# Patient Record
Sex: FEMALE | Race: WHITE | Employment: FULL TIME | URBAN - METROPOLITAN AREA
[De-identification: names, ages, dates, MRNs, and addresses within clinical notes are randomized per-mention and may not be internally consistent; named-entity substitution may affect disease eponyms.]

---

## 2017-09-26 ENCOUNTER — HOSPITAL ENCOUNTER (OUTPATIENT)
Dept: PHYSICAL THERAPY | Age: 57
Discharge: HOME | End: 2017-09-26
Payer: COMMERCIAL

## 2017-09-26 PROCEDURE — 97140 MANUAL THERAPY 1/> REGIONS: CPT

## 2017-09-26 PROCEDURE — 97161 PT EVAL LOW COMPLEX 20 MIN: CPT

## 2017-09-26 NOTE — PROGRESS NOTES
Amadou Rader 30  [facility]  [facility]  [facility], [address] ,[address], [address]  Phone: [phone]  Fax: [phone] / [address]  Patient Name: Harpal Kat : 1960   Medical   Diagnosis: Low back pain [M54.5] Treatment Diagnosis: LBP   Onset Date: chronic     Referral Source: Emilie Doyle MD Start of [facility]): 2017   Prior Hospitalization: See medical history Provider #: [id number]   Prior Level of Function: Manageable sx with ADLs   Comorbidities: H/o osteopenia, h/o L>R ankle pain given peroneal tear, h/o L RCT   Medications: Verified on Patient Summary List   The Plan of Care and following information is based on the information from the initial evaluation.   ==================================================================================  Assessment / key information:  Patient is a 61 y.o. female who presents to [facility] with Dx of LBP. Patient reports initial onset of sx in  which were of unknown etiology with worsening of sx in the last 2-3 years. She reports initial c/o LBP & progressive worsening of sx into L>R LE Patient reports sx in lower back & L glut pain are her primary complaint & are fairly constant in nature with worsening of sx with bending, lifting & activities in general over a long period of time. Sx improve with laying on her back with LE supported & use of ibuprofen. Average reported pain level at 2-3/10, 5/10 at worst & 0-1/10 at best. Upon objective evaluation patient demonstrates FIS was [facility] with PDM, EIS limited at 75% with PDM, RFIS had NE on sx, RFIL inc sx, NWAR, REIL & REIL with PT OP dec sx in lower back & RBAR. Prone knee flexion test was (+) on L>R LE for reproduction of LBP, NE on distal LE sx. Decreased sensitivity with B prone knee flexion test after REIL progressions today.  Patient can benefit from PT interventions to improve posture, decrease pain & improve strength to facilitate ADLs & overall functional status.   ==================================================================================  Eval Complexity: History HIGH Complexity :3+ comorbidities / personal factors will impact the outcome/ POC ;  Examination  MEDIUM Complexity : 3 Standardized tests and measures addressing body structure, function, activity limitation and / or participation in recreation ; Presentation LOW Complexity : Stable, uncomplicated ;  Decision Making MEDIUM Complexity : FOTO score of 26-74; Overall Complexity LOW   Problem List: pain affecting function, decrease ROM, decrease strength, impaired gait/ balance, decrease ADL/ functional abilitiies, decrease activity tolerance, decrease flexibility/ joint mobility, decrease transfer abilities and other FOTO 59 points   Treatment Plan may include any combination of the following: Therapeutic exercise, Therapeutic activities, Neuromuscular re-education, Physical agent/modality, Gait/balance training, Manual therapy, Aquatic therapy, Patient education, Self Care training, Functional mobility training, Home safety training, Stair training and Other: DN prn  Patient / Family readiness to learn indicated by: asking questions, trying to perform skills and interest  Persons(s) to be included in education: patient (P)  Barriers to Learning/Limitations: None  Measures taken:    Patient Goal (s): \"reduce pain in hips, leg & back\"   Patient self reported health status: good  Rehabilitation Potential: good   Short Term Goals: To be accomplished in  2  weeks:  1) Establish HEP to prevent further disability. 2) Patient will report decreased c/o pain to < or = 3-4/10 at worst to facilitate return to light lifting with manageable sx in lower back.   3) Patient to report 50% improvement in overall function in preparation for return to recreational activities with manageable sx in lower back.   Long Term Goals: To be accomplished in  4  weeks:  1) Improve FOTO score from 59 points to > or = 70 points indicating improved tolerance with ADLs in regards to lower back. 2) Patient will demonstrate (-) neural tension with B prone knee flexion on B LE to facilitate driving activities with manageable sx. 3) Patient to be independent & compliant with HEP in preparation for D/C.  4) Patient will be able to demonstrate the appropriate body mechanics with lifting weighted box to prevent further injury for return to lifting at home/work. Frequency / Duration:   Patient to be seen  2-3  times per week for 4  weeks:  Patient / Caregiver education and instruction: self care, activity modification, brace/ splint application and exercises  G-Codes (GP): NA  Therapist Signature: KAYLEE Wynn cert MDT Date: 3/53/4345   Certification Period: None Time: 3:18 PM   ===========================================================================================  I certify that the above Physical Therapy Services are being furnished while the patient is under my care. I agree with the treatment plan and certify that this therapy is necessary. Physician Signature:        Date:       Time:     Please sign and return to [facility] [place] or you may fax the signed copy to [phone]. Thank you.

## 2017-09-26 NOTE — PROGRESS NOTES
PHYSICAL THERAPY - DAILY TREATMENT NOTE    Patient Name: Adam Juárez        Date: 2017  : 1960   YES Patient  Verified  Visit #:     Insurance: Payor: Fercho White / Plan: [address] [place] [address] / Product Type: PPO /      In time: 3:20 P Out time: 4:10 P   Total Treatment Time: 50     Medicare Time Tracking (below)   Total Timed Codes (min):  NA 1:1 Treatment Time:  NA     TREATMENT AREA =  Low back pain [M54.5]    SUBJECTIVE  Pain Level (on 0 to 10 scale):  3-4  / 10   Medication Changes/New allergies or changes in medical history, any new surgeries or procedures? NO    If yes, update Summary List   Subjective Functional Status/Changes:  []  No changes reported     See POC           OBJECTIVE  Modalities Rationale:    PD   min [] Estim, type/location:                                      []  att     []  unatt     []  w/US     []  w/ice    []  w/heat    min []  Mechanical Traction: type/lbs                   []  pro   []  sup   []  int   []  cont    []  before manual    []  after manual    min []  Ultrasound, settings/location:      min []  Iontophoresis w/ dexamethasone, location:                                               []  take home patch       []  in clinic    min []  Ice     []  Heat    location/position:     min []  Vasopneumatic Device, press/temp:     min []  Other:    [] Skin assessment post-treatment (if applicable):    []  intact    []  redness- no adverse reaction     []redness  adverse reaction:         min Therapeutic Exercise:  [x]  See flow sheet   Rationale:         10 min Manual Therapy: PA mobs to l/s OP to REIL in prone   Rationale:      decrease pain and increase ROM to improve patient's ability to return to pain-free sitting     min Therapeutic Activity:    Rationale:      min Neuromuscular Re-ed:    Rationale:        min Gait Training:    Rationale:       min Patient Education:  YES  Reviewed HEP   []  Progressed/Changed HEP based on:         Other Objective/Functional Measures:    See POC  Reviewed posture & initial HEP with pt     Post Treatment Pain Level (on 0 to 10) scale:   2  / 10     ASSESSMENT  Assessment/Changes in Function:   See POC      []  See Progress Note/Recertification   Patient will continue to benefit from skilled PT services to modify and progress therapeutic interventions, address functional mobility deficits, address ROM deficits, address strength deficits and instruct in home and community integration to attain remaining goals.    Progress toward goals / Updated goals:    Progressing towards goals established at [place] [facility] [address]  [x]  Upgrade activities as tolerated YES Continue plan of care   []  Discharge due to :    []  Other:      Therapist: Sanjeev Chung PT    Date: 9/26/2017 Time: 7:18 PM     Future Appointments  Date Time Provider Violetta Wolfe   10/2/2017 6:30 AM Sanjeev Chung, PT [place] [address]   10/6/2017 7:30 AM Sanjeev Chung PT [place] [address]   10/9/2017 6:30 AM Sanjeev Chung PT [place] [address]   10/13/2017 6:30 AM Sanjeev Chung, PT [facility] [address]   10/16/2017 6:30 AM Sanjeev Chung PT [facility] [address]   10/20/2017 7:30 AM Sanjeev Chung, PT [facility]   10/23/2017 6:30 AM Sanjeev Chung, PT [facility] [address]   10/27/2017 7:30 AM Sanjeev Chung PT [facility]   10/30/2017 6:30 AM Sanjeev Chung PT [facility] [address]   11/3/2017 7:30 AM Nancy Esparza, PT [facility]

## 2017-10-02 ENCOUNTER — HOSPITAL ENCOUNTER (OUTPATIENT)
Dept: PHYSICAL THERAPY | Age: 57
Discharge: HOME | End: 2017-10-02
Payer: COMMERCIAL

## 2017-10-02 PROCEDURE — 97110 THERAPEUTIC EXERCISES: CPT

## 2017-10-06 ENCOUNTER — HOSPITAL ENCOUNTER (OUTPATIENT)
Dept: PHYSICAL THERAPY | Age: 57
Discharge: HOME | End: 2017-10-06
Payer: COMMERCIAL

## 2017-10-06 PROCEDURE — 97140 MANUAL THERAPY 1/> REGIONS: CPT

## 2017-10-06 PROCEDURE — 97110 THERAPEUTIC EXERCISES: CPT

## 2017-10-06 NOTE — PROGRESS NOTES
PHYSICAL THERAPY - DAILY TREATMENT NOTE    Patient Name: Silvio Chinchilla        Date: 10/6/2017  : 1960   YES Patient  Verified  Visit #:   3   of   12  Insurance: Payor: Sly Moon / Plan: [address] [place] [address] / Product Type: PPO /      In time: 7 A Out time: 8:10 A   Total Treatment Time: 65     Medicare Time Tracking (below)   Total Timed Codes (min):  NA 1:1 Treatment Time:  NA     TREATMENT AREA =  Low back pain [M54.5]    SUBJECTIVE  Pain Level (on 0 to 10 scale):  3  / 10   Medication Changes/New allergies or changes in medical history, any new surgeries or procedures? NO    If yes, update Summary List   Subjective Functional Status/Changes:  []  No changes reported     Patient reports no new complaints since last treatment.            OBJECTIVE  Modalities Rationale:     decrease pain to improve patient's ability to return to pain-free lifting   min [] Estim, type/location:                                      []  att     []  unatt     []  w/US     []  w/ice    []  w/heat    min []  Mechanical Traction: type/lbs                   []  pro   []  sup   []  int   []  cont    []  before manual    []  after manual    min []  Ultrasound, settings/location:      min []  Iontophoresis w/ dexamethasone, location:                                               []  take home patch       []  in clinic   10 min []  Ice     [x]  Heat    location/position: Supine to l/s     min []  Vasopneumatic Device, press/temp:     min []  Other:    [] Skin assessment post-treatment (if applicable):    []  intact    []  redness- no adverse reaction     []redness  adverse reaction:        45/  35 min Therapeutic Exercise:  [x]  See flow sheet   Rationale:      increase ROM and increase strength to improve the patients ability to return to  Pain-free housework     10 min Manual Therapy: PA mobs to l/s f/b OP to REIL in prone   Rationale:      decrease pain and increase ROM to improve patient's ability to return to pain-free standing      min Therapeutic Activity:    Rationale:      min Neuromuscular Re-ed:    Rationale:        min Gait Training:    Rationale:       min Patient Education:  YES  Reviewed HEP   []  Progressed/Changed HEP based on: Other Objective/Functional Measures:    TE per flow sheet     Post Treatment Pain Level (on 0 to 10) scale:     2/ 10     ASSESSMENT  Assessment/Changes in Function:   Pt reported immediate reduction of sx with manual therapy & PA pressure to l/s     []  See Progress Note/Recertification   Patient will continue to benefit from skilled PT services to modify and progress therapeutic interventions, address functional mobility deficits, address ROM deficits, address strength deficits, analyze and address soft tissue restrictions and instruct in home and community integration to attain remaining goals.    Progress toward goals / Updated goals:    Progressing towards STG 2, 3     PLAN  [x]  Upgrade activities as tolerated YES Continue plan of care   []  Discharge due to :    []  Other:      Therapist: Desmond Washington PT    Date: 10/6/2017 Time: 10:35 AM     Future Appointments  Date Time Provider Violetta Wolfe   10/9/2017 6:30 AM Desmond Washington PT [facility]   10/13/2017 6:30 AM Desmond Washington PT [facility]   10/16/2017 6:30 AM Desmond Washington PT [facility]   10/20/2017 7:30 AM Desmond Washington, PT [facility]   10/23/2017 6:30 AM Desmond Washington PT [facility]   10/27/2017 7:30 AM Desmond Washington PT [facility]   10/30/2017 6:30 AM Desmond Washington PT [facility]   11/3/2017 7:30 AM Nancy Bean, PT [facility]

## 2017-10-09 ENCOUNTER — HOSPITAL ENCOUNTER (OUTPATIENT)
Dept: PHYSICAL THERAPY | Age: 57
Discharge: HOME | End: 2017-10-09
Payer: COMMERCIAL

## 2017-10-09 PROCEDURE — 97140 MANUAL THERAPY 1/> REGIONS: CPT

## 2017-10-09 PROCEDURE — 97110 THERAPEUTIC EXERCISES: CPT

## 2017-10-09 NOTE — PROGRESS NOTES
PHYSICAL THERAPY - DAILY TREATMENT NOTE    Patient Name: Laurie Collins        Date: 10/9/2017  : 1960   YES Patient  Verified  Visit #:     Insurance: Payor: Edwar Cunha / Plan: [address] [place] [address] / Product Type: PPO /      In time: 6:30 A Out time: 7:25 A    Total Treatment Time: 54     Medicare Time Tracking (below)   Total Timed Codes (min):  NA 1:1 Treatment Time:  NA     TREATMENT AREA =  Low back pain [M54.5]    SUBJECTIVE  Pain Level (on 0 to 10 scale):  0  / 10   Medication Changes/New allergies or changes in medical history, any new surgeries or procedures? NO    If yes, update Summary List   Subjective Functional Status/Changes:  []  No changes reported   Patient reports she did well after last PT, she does not   have pain today.        OBJECTIVE  Modalities Rationale:     decrease pain to improve patient's ability to return to pain-free standing   min [] Estim, type/location:                                      []  att     []  unatt     []  w/US     []  w/ice    []  w/heat    min []  Mechanical Traction: type/lbs                   []  pro   []  sup   []  int   []  cont    []  before manual    []  after manual    min []  Ultrasound, settings/location:      min []  Iontophoresis w/ dexamethasone, location:                                               []  take home patch       []  in clinic   10 min []  Ice     [x]  Heat    location/position: MHP to l/s in supine    min []  Vasopneumatic Device, press/temp:     min []  Other:    [] Skin assessment post-treatment (if applicable):    []  intact    []  redness- no adverse reaction     []redness  adverse reaction:        35 min Therapeutic Exercise:  [x]  See flow sheet   Rationale:      increase ROM and increase strength to improve the patients ability to return to pain-free fitness activities     10 min Manual Therapy: PA mobs to l/s in prone   Rationale:      increase ROM to improve patient's ability to return to upright standing     min Therapeutic Activity:    Rationale:      min Neuromuscular Re-ed:    Rationale:        min Gait Training:    Rationale:       min Patient Education:  YES  Reviewed HEP   []  Progressed/Changed HEP based on: Other Objective/Functional Measures:    TE per flow sheet     Post Treatment Pain Level (on 0 to 10) scale:   0  / 10     ASSESSMENT  Assessment/Changes in Function:   Good tolerance to today's treatment, no increase in pain      []  See Progress Note/Recertification   Patient will continue to benefit from skilled PT services to modify and progress therapeutic interventions, address functional mobility deficits, address ROM deficits, address strength deficits, assess and modify postural abnormalities and instruct in home and community integration to attain remaining goals.    Progress toward goals / Updated goals:    Progressing towards STG 2, 3     PLAN  [x]  Upgrade activities as tolerated YES Continue plan of care   []  Discharge due to :    []  Other:      Therapist: Bibi Donohue PT    Date: 10/9/2017 Time: 8:52 AM     Future Appointments  Date Time Provider Violetta Wolfe   10/13/2017 6:30 AM Bibi Donohue PT [address]   10/16/2017 6:30 AM Bibi Donohue PT [address]   10/20/2017 7:30 AM Bibi Donohue PT [place] [address] [facility]   10/23/2017 6:30 AM Bibi Donohue PT [address]   10/27/2017 7:30 AM Bibi Donohue PT [address]   10/30/2017 6:30 AM Bibi Donohue PT [place] [address] [facility]   11/3/2017 7:30 AM Nancy Marin, PT [address]

## 2017-10-13 ENCOUNTER — HOSPITAL ENCOUNTER (OUTPATIENT)
Dept: PHYSICAL THERAPY | Age: 57
Discharge: HOME | End: 2017-10-13
Payer: COMMERCIAL

## 2017-10-13 PROCEDURE — 97140 MANUAL THERAPY 1/> REGIONS: CPT

## 2017-10-13 PROCEDURE — 97110 THERAPEUTIC EXERCISES: CPT

## 2017-10-13 NOTE — PROGRESS NOTES
PHYSICAL THERAPY - DAILY TREATMENT NOTE    Patient Name: Tamara Weiss        Date: 10/13/2017  : 1960   YES Patient  Verified  Visit #:     Insurance: Payor: Tyrell Ponce / Plan: [address] [place] [address] / Product Type: PPO /      In time: 6:35 A Out time: 7:40 A    Total Treatment Time: 55     Medicare Time Tracking (below)   Total Timed Codes (min):  NA 1:1 Treatment Time:  NA     TREATMENT AREA =  Low back pain [M54.5]    SUBJECTIVE  Pain Level (on 0 to 10 scale):  0  / 10   Medication Changes/New allergies or changes in medical history, any new surgeries or procedures? NO    If yes, update Summary List   Subjective Functional Status/Changes:  []  No changes reported   Patient reports no new complaints since last treatment.          OBJECTIVE  Modalities Rationale:     decrease pain to improve patient's ability to return to pain-free standing   min [] Estim, type/location:                                      []  att     []  unatt     []  w/US     []  w/ice    []  w/heat    min []  Mechanical Traction: type/lbs                   []  pro   []  sup   []  int   []  cont    []  before manual    []  after manual    min []  Ultrasound, settings/location:      min []  Iontophoresis w/ dexamethasone, location:                                               []  take home patch       []  in clinic   10 min []  Ice     [x]  Heat    location/position: MHP to l/s in supine    min []  Vasopneumatic Device, press/temp:     min []  Other:    [] Skin assessment post-treatment (if applicable):    []  intact    []  redness- no adverse reaction     []redness  adverse reaction:        35 min Therapeutic Exercise:  [x]  See flow sheet   Rationale:      increase ROM and increase strength to improve the patients ability to return to pain-free fitness activities     10 min Manual Therapy: PA mobs to l/s in prone, OP to REIL at prone   Rationale:      increase ROM to improve patient's ability to return to upright standing     min Therapeutic Activity:    Rationale:      min Neuromuscular Re-ed:    Rationale:        min Gait Training:    Rationale:       min Patient Education:  YES  Reviewed HEP   []  Progressed/Changed HEP based on: Other Objective/Functional Measures:    TE per flow sheet     Post Treatment Pain Level (on 0 to 10) scale:   0  / 10     ASSESSMENT  Assessment/Changes in Function:   Good tolerance to today's treatment, no increase in pain, prone knee flex test continues to be mildly (+) for dural tension      []  See Progress Note/Recertification   Patient will continue to benefit from skilled PT services to modify and progress therapeutic interventions, address functional mobility deficits, address ROM deficits, address strength deficits, assess and modify postural abnormalities and instruct in home and community integration to attain remaining goals.    Progress toward goals / Updated goals:    Progressing towards STG 2, 3     PLAN  [x]  Upgrade activities as tolerated YES Continue plan of care   []  Discharge due to :    []  Other:      Therapist: Black Hoang, PT    Date: 10/13/2017 Time: 8:52 AM     Future Appointments  Date Time Provider Violetta Wolfe   10/16/2017 6:30 AM Black Hoang, PT [facility]   10/20/2017 7:30 AM Black Hoang, PT [facility]   10/23/2017 6:30 AM King Miller [facility]   10/27/2017 7:30 AM Black Hoang, PT [facility]   10/30/2017 6:30 AM Black Hoang PT [facility]   11/3/2017 7:30 AM New Carvajal PT [facility]

## 2017-10-16 ENCOUNTER — HOSPITAL ENCOUNTER (OUTPATIENT)
Dept: PHYSICAL THERAPY | Age: 57
Discharge: HOME | End: 2017-10-16
Payer: COMMERCIAL

## 2017-10-16 PROCEDURE — 97140 MANUAL THERAPY 1/> REGIONS: CPT

## 2017-10-16 PROCEDURE — 97110 THERAPEUTIC EXERCISES: CPT

## 2017-10-16 NOTE — PROGRESS NOTES
PHYSICAL THERAPY - DAILY TREATMENT NOTE    Patient Name: Joo Mills        Date: 10/16/2017  : 1960   YES Patient  Verified  Visit #:      of   12  Insurance: Payor: Jhonny David / Plan: [address] [place] [address] / Product Type: PPO /      In time: 7 A Out time: 8:00 A   Total Treatment Time: 55/  40     Medicare Time Tracking (below)   Total Timed Codes (min):  NA 1:1 Treatment Time:  NA     TREATMENT AREA =  Low back pain [M54.5]    SUBJECTIVE  Pain Level (on 0 to 10 scale):  0  / 10   Medication Changes/New allergies or changes in medical history, any new surgeries or procedures? NO    If yes, update Summary List   Subjective Functional Status/Changes:  []  No changes reported     Patient reports that she is doing well today, no pain in her back or down her legs today.            OBJECTIVE  Modalities Rationale:     decrease pain to improve patient's ability to return to pain-free standing    min [] Estim, type/location:                                      []  att     []  unatt     []  w/US     []  w/ice    []  w/heat    min []  Mechanical Traction: type/lbs                   []  pro   []  sup   []  int   []  cont    []  before manual    []  after manual    min []  Ultrasound, settings/location:      min []  Iontophoresis w/ dexamethasone, location:                                               []  take home patch       []  in clinic   10 min []  Ice     [x]  Heat    location/position: Supine to l/s     min []  Vasopneumatic Device, press/temp:     min []  Other:    [] Skin assessment post-treatment (if applicable):    []  intact    []  redness- no adverse reaction     []redness  adverse reaction:        35/  30 min Therapeutic Exercise:  [x]  See flow sheet   Rationale:      increase ROM and increase strength to improve the patients ability to return to light lifting for housework      10 min Manual Therapy: PA mobs to l/s in prone f/b OP to REIL   Rationale:   Improve ROM to facilitate upright standing        min Therapeutic Activity:    Rationale:         min Neuromuscular Re-ed:    Rationale:       min Gait Training:    Rationale:       min Patient Education:  YES  Reviewed HEP   []  Progressed/Changed HEP based on: Other Objective/Functional Measures:    TE per flow sheet     Post Treatment Pain Level (on 0 to 10) scale:   0  / 10     ASSESSMENT  Assessment/Changes in Function:     Good tolerance to today's treatment, no increase in pain      []  See Progress Note/Recertification   Patient will continue to benefit from skilled PT services to modify and progress therapeutic interventions, address functional mobility deficits, address ROM deficits, address strength deficits, analyze and address soft tissue restrictions, analyze and modify body mechanics/ergonomics, assess and modify postural abnormalities and instruct in home and community integration to attain remaining goals.    Progress toward goals / Updated goals:    Progressing towards STG 2, 3     PLAN  [x]  Upgrade activities as tolerated YES Continue plan of care   []  Discharge due to :    []  Other:      Therapist: Wong Hernandez PT    Date: 10/16/2017 Time: 9:41 AM     Future Appointments  Date Time Provider Violetta Wolfe   10/20/2017 7:30 AM Wong Hernandez PT [facility]   10/23/2017 [facility]   10/27/2017 7:30 AM Wong Hernandez PT [facility]   10/30/2017 6:30 AM Wong Hernandez PT [facility]   11/3/2017 7:30 AM Wong Hernandez PT [facility]

## 2017-10-20 ENCOUNTER — HOSPITAL ENCOUNTER (OUTPATIENT)
Dept: PHYSICAL THERAPY | Age: 57
Discharge: HOME | End: 2017-10-20
Payer: COMMERCIAL

## 2017-10-20 PROCEDURE — 97110 THERAPEUTIC EXERCISES: CPT

## 2017-10-20 PROCEDURE — 97140 MANUAL THERAPY 1/> REGIONS: CPT

## 2017-10-20 NOTE — PROGRESS NOTES
PHYSICAL THERAPY - DAILY TREATMENT NOTE    Patient Name: Oliva Rick        Date: 10/20/2017  : 1960   YES Patient  Verified  Visit #:     Insurance: Payor: Jarred Porras / Plan: [address] [place] [address] / Product Type: PPO /      In time: 7:30 A Out time: 8:25 A   Total Treatment Time: 50     Medicare Time Tracking (below)   Total Timed Codes (min):  NA 1:1 Treatment Time:  NA     TREATMENT AREA =  Low back pain [M54.5]    SUBJECTIVE  Pain Level (on 0 to 10 scale):  0  / 10   Medication Changes/New allergies or changes in medical history, any new surgeries or procedures? NO    If yes, update Summary List   Subjective Functional Status/Changes:  []  No changes reported     Patient reports she has been feeling better, not much leg pain some mild soreness in her lower back but the exercises are making her feel better & stronger.           OBJECTIVE  Modalities Rationale:     decrease pain to improve patient's ability to return to pain-free standing   min [] Estim, type/location:                                      []  att     []  unatt     []  w/US     []  w/ice    []  w/heat    min []  Mechanical Traction: type/lbs                   []  pro   []  sup   []  int   []  cont    []  before manual    []  after manual    min []  Ultrasound, settings/location:      min []  Iontophoresis w/ dexamethasone, location:                                               []  take home patch       []  in clinic   10 min [x]  Ice     []  Heat    location/position: Supine to     min []  Vasopneumatic Device, press/temp:     min []  Other:    [] Skin assessment post-treatment (if applicable):    []  intact    []  redness- no adverse reaction     []redness  adverse reaction:        30 min Therapeutic Exercise:  [x]  See flow sheet   Rationale:      increase ROM and increase strength to improve the patients ability to tolerate light lifting    10 min Manual Therapy: PA mobs to l/s, OP to REIL in prone    Rationale: decrease pain and increase ROM to improve patient's ability to tolerate upright standing     min Therapeutic Activity:    Rationale:      min Neuromuscular Re-ed:    Rationale:        min Gait Training:    Rationale:       min Patient Education:  YES  Reviewed HEP   []  Progressed/Changed HEP based on: Other Objective/Functional Measures: Added BET with dowel, hip hinge (seated & sit to stand)     Post Treatment Pain Level (on 0 to 10) scale:   0  / 10     ASSESSMENT  Assessment/Changes in Function:   Good understanding of BET concepts, improved strength with H/L tabletop today     []  See Progress Note/Recertification   Patient will continue to benefit from skilled PT services to modify and progress therapeutic interventions, address functional mobility deficits, address ROM deficits, address strength deficits, assess and modify postural abnormalities and instruct in home and community integration to attain remaining goals.    Progress toward goals / Updated goals:    Progressing towards STG 2, 3     PLAN  [x]  Upgrade activities as tolerated YES Continue plan of care   []  Discharge due to :    []  Other:      Therapist: Shahana Gardner, PT    Date: 10/20/2017 Time: 8:47 AM     Future Appointments  Date Time Provider Violetta Wolfe   10/23/2017 6:30 AM Guzman Gresham [facility]   10/27/2017 7:30 AM Shahana Gardner, PT [facility]   10/30/2017 6:30 AM Shahana Gardner PT [facility]   11/3/2017 7:30 AM Shahana Gardner, PT [facility]

## 2017-10-23 ENCOUNTER — HOSPITAL ENCOUNTER (OUTPATIENT)
Dept: PHYSICAL THERAPY | Age: 57
Discharge: HOME | End: 2017-10-23
Payer: COMMERCIAL

## 2017-10-23 PROCEDURE — 97110 THERAPEUTIC EXERCISES: CPT

## 2017-10-23 NOTE — PROGRESS NOTES
PHYSICAL THERAPY - DAILY TREATMENT NOTE    Patient Name: Hood Cartwright        Date: 10/23/2017  : 1960   YES Patient  Verified  Visit #:     Insurance: Payor: Alexandria Werner / Plan: [address] [place] [address] / Product Type: PPO /      In time: 6:40am Out time: 7:35pm   Total Treatment Time: 55/50     Medicare Time Tracking (below)   Total Timed Codes (min):  na 1:1 Treatment Time:  na     TREATMENT AREA =  Low back pain [M54.5]  SUBJECTIVE  Pain Level (on 0 to 10 scale):  0  / 10   Medication Changes/New allergies or changes in medical history, any new surgeries or procedures? NO    If yes, update Summary List   Subjective Functional Status/Changes:  []  No changes reported     Patient reports that she feels better when she is moving but notices some left sided low back discomfort when sitting for long periods and/or bending. OBJECTIVE  Modalities Rationale:     decrease inflammation, decrease pain and increase tissue extensibility to improve patient's ability to perform functional ADLs   min [] Estim, type/location:                                      []  att     []  unatt     []  w/US     []  w/ice    []  w/heat    min []  Mechanical Traction: type/lbs                   []  pro   []  sup   []  int   []  cont    []  before manual    []  after manual    min []  Ultrasound, settings/location:      min []  Iontophoresis w/ dexamethasone, location:                                               []  take home patch       []  in clinic   10 min []  Ice     [x]  Heat    location/position: Supine to L/S post treatment.      min []  Vasopneumatic Device, press/temp:     min []  Other:    [] Skin assessment post-treatment (if applicable):    []  intact    []  redness- no adverse reaction     []redness  adverse reaction:        40 min Therapeutic Exercise:  [x]  See flow sheet   Rationale:      increase ROM and increase strength to improve the patients ability to regain functional mobility of L/S for light lifting.     min Manual Therapy:    Rationale:      decrease pain, increase ROM, increase tissue extensibility and decrease trigger points to improve the patient's ability to regain full functional mobility     min Therapeutic Activity:    Rationale:    increase strength, improve coordination and increase proprioception to improve the patients ability to      min Neuromuscular Re-ed:    Rationale:    improve coordination, improve balance and increase proprioception to improve the patients ability to      min Gait Training:    Rationale:       min Patient Education:  YES  Reviewed HEP   []  Progressed/Changed HEP based on: Other Objective/Functional Measures:    TE per FS with focus on sustaining TA draw / neutral spine. Post Treatment Pain Level (on 0 to 10) scale:   0  / 10     ASSESSMENT  Assessment/Changes in Function:   Patient able to tolerate today's session with no inc in sx, however minor VC's with maintaining TA draw during BET. []  See Progress Note/Recertification   Patient will continue to benefit from skilled PT services to modify and progress therapeutic interventions, address functional mobility deficits, address ROM deficits, address strength deficits, analyze and address soft tissue restrictions, analyze and cue movement patterns, analyze and modify body mechanics/ergonomics and assess and modify postural abnormalities to attain remaining goals. Progress toward goals / Updated goals:    Progressing towards LTG2.      PLAN  [x]  Upgrade activities as tolerated YES Continue plan of care   []  Discharge due to :    []  Other:      Therapist: UMESH Oswald    Date: 10/23/2017 Time: 6:46 AM     Future Appointments  Date Time Provider Violetta Wolfe   10/27/2017 7:30 AM Rima Lamar [facility]   10/30/2017 6:30 AM Radha Conway PT [facility]   11/3/2017 7:30 AM Radha Conway PT [facility]

## 2017-10-27 ENCOUNTER — HOSPITAL ENCOUNTER (OUTPATIENT)
Dept: PHYSICAL THERAPY | Age: 57
Discharge: HOME | End: 2017-10-27
Payer: COMMERCIAL

## 2017-10-27 PROCEDURE — 97140 MANUAL THERAPY 1/> REGIONS: CPT

## 2017-10-27 PROCEDURE — 97110 THERAPEUTIC EXERCISES: CPT

## 2017-10-27 NOTE — PROGRESS NOTES
PHYSICAL THERAPY - DAILY TREATMENT NOTE    Patient Name: Ahsan French        Date: 10/27/2017  : 1960   YES Patient  Verified  Visit #:     Insurance: Payor: Corey Stiles / Plan: [address] [place] [address] / Product Type: PPO /      In time: 7:35 A Out time: 8:35 A   Total Treatment Time: 55     Medicare Time Tracking (below)   Total Timed Codes (min):  NA 1:1 Treatment Time:  NA     TREATMENT AREA =  Low back pain [M54.5]    SUBJECTIVE  Pain Level (on 0 to 10 scale):  0  / 10   Medication Changes/New allergies or changes in medical history, any new surgeries or procedures?     NO    If yes, update Summary List   Subjective Functional Status/Changes:  []  No changes reported     See PN           OBJECTIVE  Modalities Rationale:     decrease pain to improve patient's ability to return to pain-free standing   min [] Estim, type/location:                                      []  att     []  unatt     []  w/US     []  w/ice    []  w/heat    min []  Mechanical Traction: type/lbs                   []  pro   []  sup   []  int   []  cont    []  before manual    []  after manual    min []  Ultrasound, settings/location:      min []  Iontophoresis w/ dexamethasone, location:                                               []  take home patch       []  in clinic   10 min []  Ice     [x]  Heat    location/position: Supine to l/s     min []  Vasopneumatic Device, press/temp:     min []  Other:    [] Skin assessment post-treatment (if applicable):    []  intact    []  redness- no adverse reaction     []redness  adverse reaction:        35 min Therapeutic Exercise:  [x]  See flow sheet   Rationale:      increase ROM and increase strength to improve the patients ability to return to light lifting at home      10 min Manual Therapy: PA mobs to l/s, OP to REIL in prone   Rationale:      decrease pain and increase ROM to improve patient's ability to tolerate upright standing     min Therapeutic Activity: Rationale:      min Neuromuscular Re-ed:    Rationale:        min Gait Training:    Rationale:       min Patient Education:  [address]   []  Progressed/Changed HEP based on: Other Objective/Functional Measures:    TE per flow sheet     Post Treatment Pain Level (on 0 to 10) scale:   0  / 10     ASSESSMENT  Assessment/Changes in Function:   Min v/c for proper hip hinge with BET with dowel today      []  See Progress Note/Recertification   Patient will continue to benefit from skilled PT services to modify and progress therapeutic interventions, address functional mobility deficits, assess and modify postural abnormalities and instruct in home and community integration to attain remaining goals.    Progress toward goals / Updated goals:    Progressing towards STG 3, STG 1 met     PLAN  [x]  Upgrade activities as tolerated YES Continue plan of care   []  Discharge due to :    [x]  Other: PN to MD     Therapist: Jennifer Martin PT    Date: 10/27/2017 Time: 9:38 AM     Future Appointments  Date Time Provider Violetta Wolfe   10/30/2017 6:30 AM Jennifer Martin PT [facility]   11/3/2017 7:30 AM Jennifer Martin PT [facility]

## 2017-10-27 NOTE — PROGRESS NOTES
[address]  [facility]  [facility]  Jorge Ellis [facility], [address], [address]  Phone: [phone]  Fax: [phone]  PROGRESS NOTE  Patient Name: Tawanda Saxena : 1960   Treatment/Medical Diagnosis: Low back pain [M54.5]   Referral Source: Selam Mckeon MD     Date of Initial Visit: 17 Attended Visits: 9 Missed Visits: 0     SUMMARY OF TREATMENT  Therapeutic exercise including ROM, stretching, gentle strengthening, l/s stabilization training, postural ed, patient education, HEP instruction, MHP, manual therapy including l/s mobs to improve mobility. [facility]  Mrs. Dina Hu has made good progress with PT & reports c/o B LE pain have decreased since initiation of PT. Her sx are generally centralized to l/s & at times into L glut but are no longer constant in nature. Goal/Measure of Progress Goal Met? 1.  Establish HEP to prevent further disability. Status at last Eval: NA Current Status: HEP established  yes   2. Patient will report decreased c/o pain to < or = 3-4/10 at worst to facilitate return to light lifting with manageable sx in lower back. Status at last Eval: 5/10 at worst Current Status: 5/10 at worst, although daily average pain has improved progressing   3. Patient to report 50% improvement in overall function in preparation for return to recreational activities with manageable sx in lower back. Status at last Eval: NA Current Status: 25-30% progressing     New Goals to be achieved in __3-4__  weeks:  1. Improve FOTO score from 59 points to > or = 70 points indicating improved tolerance with ADLs in regards to lower back. 2.  Patient will demonstrate (-) neural tension with B prone knee flexion on B LE to facilitate driving activities with manageable sx. 3.  Patient will be able to demonstrate the appropriate body mechanics with lifting weighted box to prevent further injury for return to lifting at home/work.    4. Patient to be independent & compliant with HEP in preparation for D/C. G-Codes (GP): NA  RECOMMENDATIONS  Patient to continue with PT for up to 3-4 more weeks prn in order to progress towards achieving all LTGs. If you have any questions/comments please contact us directly at [phone]. Thank you for allowing us to assist in the care of your patient. Therapist Signature: KAYLEE Perales, cert MDT Date: 38/30/8715     Time: 2:10 PM   NOTE TO PHYSICIAN:  PLEASE COMPLETE THE ORDERS BELOW AND FAX TO   [facility]: [phone]. If you are unable to process this request in 24 hours please contact our office: [phone].    ___ I have read the above report and request that my patient continue as recommended.   ___ I have read the above report and request that my patient continue therapy with the following changes/special instructions:_________________________________________________________   ___ I have read the above report and request that my patient be discharged from therapy.      Physician Signature:        Date:       Time:

## 2017-10-30 ENCOUNTER — HOSPITAL ENCOUNTER (OUTPATIENT)
Dept: PHYSICAL THERAPY | Age: 57
Discharge: HOME | End: 2017-10-30
Payer: COMMERCIAL

## 2017-10-30 PROCEDURE — 97110 THERAPEUTIC EXERCISES: CPT

## 2017-10-30 PROCEDURE — 97140 MANUAL THERAPY 1/> REGIONS: CPT

## 2017-10-30 NOTE — PROGRESS NOTES
PHYSICAL THERAPY - DAILY TREATMENT NOTE    Patient Name: Ahsna French        Date: 10/30/2017  : 1960   YES Patient  Verified  Visit #:   10  of   12  Insurance: Payor: Corey Stiles / Plan: [address] [place] [address] / Product Type: PPO /      In time: 6:30 A Out time: 7:50 A   Total Treatment Time: 65     Medicare Time Tracking (below)   Total Timed Codes (min):  NA 1:1 Treatment Time:  NA     TREATMENT AREA =  Low back pain [M54.5]    SUBJECTIVE  Pain Level (on 0 to 10 scale):  0  / 10   Medication Changes/New allergies or changes in medical history, any new surgeries or procedures? NO    If yes, update Summary List   Subjective Functional Status/Changes:  []  No changes reported     Patient reports she did a lot of walking & working over the weekend and did well.            OBJECTIVE  Modalities Rationale:     decrease pain to improve patient's ability to return to pain-free standing & lifting    min [] Estim, type/location:                                      []  att     []  unatt     []  w/US     []  w/ice    []  w/heat    min []  Mechanical Traction: type/lbs                   []  pro   []  sup   []  int   []  cont    []  before manual    []  after manual    min []  Ultrasound, settings/location:      min []  Iontophoresis w/ dexamethasone, location:                                               []  take home patch       []  in clinic   10 min []  Ice     [x]  Heat    location/position: Supine to l/s     min []  Vasopneumatic Device, press/temp:     min []  Other:    [] Skin assessment post-treatment (if applicable):    []  intact    []  redness- no adverse reaction     []redness  adverse reaction:        45 min Therapeutic Exercise:  [x]  See flow sheet   Rationale:      increase ROM and increase strength to improve the patients ability to return to light lifting      10 min Manual Therapy: PA mobs to l/s, OP to REIL in prone   Rationale:     Improve ROM so ROM is available for ADLs      min Therapeutic Activity:    Rationale:         min Neuromuscular Re-ed:    Rationale:       min Gait Training:    Rationale:       min Patient Education:  YES  Reviewed HEP   []  Progressed/Changed HEP based on: Other Objective/Functional Measures:    TE per flow sheet      Post Treatment Pain Level (on 0 to 10) scale:   0  / 10     ASSESSMENT  Assessment/Changes in Function:     Good tolerance to today's treatment, added Tband rows/ext, slight c/o L hip pain after TM but resolved prior to end of PT     []  See Progress Note/Recertification   Patient will continue to benefit from skilled PT services to modify and progress therapeutic interventions, address functional mobility deficits, address ROM deficits, address strength deficits, analyze and cue movement patterns, analyze and modify body mechanics/ergonomics, assess and modify postural abnormalities and instruct in home and community integration to attain remaining goals.    Progress toward goals / Updated goals:    Progressing towards newly established LTGs     PLAN  [x]  Upgrade activities as tolerated YES Continue plan of care   []  Discharge due to :    []  Other:      Therapist: Gris Zuñiga PT    Date: 10/30/2017 Time: 10:18 AM     Future Appointments  Date Time Provider Violetta Wolfe   11/3/2017 7:30 AM Gris Zuñiga PT [facility]

## 2017-11-03 ENCOUNTER — HOSPITAL ENCOUNTER (OUTPATIENT)
Dept: PHYSICAL THERAPY | Age: 57
Discharge: HOME | End: 2017-11-03
Payer: COMMERCIAL

## 2017-11-03 PROCEDURE — 97140 MANUAL THERAPY 1/> REGIONS: CPT

## 2017-11-03 PROCEDURE — 97110 THERAPEUTIC EXERCISES: CPT

## 2017-11-03 NOTE — PROGRESS NOTES
PHYSICAL THERAPY - DAILY TREATMENT NOTE    Patient Name: Terri Juarez        Date: 11/3/2017  : 1960   YES Patient  Verified  Visit #:     Insurance: Payor: Adamaris Brock / Plan: [address] [place] [address] / Product Type: PPO /      In time: 7:30 A Out time: 8:55 A   Total Treatment Time: 60     Medicare Time Tracking (below)   Total Timed Codes (min):  NA 1:1 Treatment Time:  NA     TREATMENT AREA =  Low back pain [M54.5]    SUBJECTIVE  Pain Level (on 0 to 10 scale):  0  / 10   Medication Changes/New allergies or changes in medical history, any new surgeries or procedures? NO    If yes, update Summary List   Subjective Functional Status/Changes:  []  No changes reported     Patient reports she is having more pain in her t/s spine today, this has been bothering her for awhile.            OBJECTIVE  Modalities Rationale:     decrease pain to improve patient's ability to return to pain-free standing & lifting    min [] Estim, type/location:                                      []  att     []  unatt     []  w/US     []  w/ice    []  w/heat    min []  Mechanical Traction: type/lbs                   []  pro   []  sup   []  int   []  cont    []  before manual    []  after manual    min []  Ultrasound, settings/location:      min []  Iontophoresis w/ dexamethasone, location:                                               []  take home patch       []  in clinic   10 min []  Ice     [x]  Heat    location/position: Supine to l/s    min []  Vasopneumatic Device, press/temp:     min []  Other:    [] Skin assessment post-treatment (if applicable):    []  intact    []  redness- no adverse reaction     []redness  adverse reaction:        40 min Therapeutic Exercise:  [x]  See flow sheet   Rationale:      increase ROM and increase strength to improve the patients ability to return to light lifting      10 min Manual Therapy: PA mobs to l/s & mid/lower t/s, OP to REIL in prone at level of mid t/s   Rationale: Improve ROM so ROM is available for ADLs      min Therapeutic Activity:    Rationale:         min Neuromuscular Re-ed:    Rationale:       min Gait Training:    Rationale:       min Patient Education:  YES  Reviewed HEP   []  Progressed/Changed HEP based on: Other Objective/Functional Measures: Added seated SB flexion stretch      Post Treatment Pain Level (on 0 to 10) scale:   0  / 10     ASSESSMENT  Assessment/Changes in Function:     Good tolerance to today's treatment, no increase or reproduction of t/s pain today    []  See Progress Note/Recertification   Patient will continue to benefit from skilled PT services to modify and progress therapeutic interventions, address functional mobility deficits, address ROM deficits, address strength deficits, analyze and cue movement patterns, analyze and modify body mechanics/ergonomics, assess and modify postural abnormalities and instruct in home and community integration to attain remaining goals. Progress toward goals / Updated goals:    Progressing towards newly established LTGs     PLAN  [x]  Upgrade activities as tolerated YES Continue plan of care   []  Discharge due to :    []  Other:      Therapist: Princess Dennison PT    Date: 11/3/2017 Time: 10:18 AM     No future appointments.

## 2017-11-09 ENCOUNTER — HOSPITAL ENCOUNTER (OUTPATIENT)
Dept: PHYSICAL THERAPY | Age: 57
Discharge: HOME | End: 2017-11-09
Payer: COMMERCIAL

## 2017-11-09 PROCEDURE — 97110 THERAPEUTIC EXERCISES: CPT

## 2017-11-09 PROCEDURE — 97140 MANUAL THERAPY 1/> REGIONS: CPT

## 2017-11-10 NOTE — PROGRESS NOTES
PHYSICAL THERAPY - DAILY TREATMENT NOTE    Patient Name: Zoya Wu        Date: 2017  : 1960   YES Patient  Verified  Visit #:     Insurance: Payor: Linda Oviedo / Plan: [address] [place] [address] / Product Type: PPO /      In time: 6:00 P Out time: 7:10 P   Total Treatment Time: 65     Medicare Time Tracking (below)   Total Timed Codes (min):  NA 1:1 Treatment Time:  NA     TREATMENT AREA =  Low back pain [M54.5]    SUBJECTIVE  Pain Level (on 0 to 10 scale):  0  / 10   Medication Changes/New allergies or changes in medical history, any new surgeries or procedures? NO    If yes, update Summary List   Subjective Functional Status/Changes:  []  No changes reported     Patient reports she is having more pain in her t/s spine and into her neck but this could be due to stress at home/work.           OBJECTIVE  Modalities Rationale:     decrease pain to improve patient's ability to return to pain-free sitting at work    min [] Estim, type/location:                                      []  att     []  unatt     []  w/US     []  w/ice    []  w/heat    min []  Mechanical Traction: type/lbs                   []  pro   []  sup   []  int   []  cont    []  before manual    []  after manual    min []  Ultrasound, settings/location:      min []  Iontophoresis w/ dexamethasone, location:                                               []  take home patch       []  in clinic   10 min []  Ice     [x]  Heat    location/position: Supine to l/s    min []  Vasopneumatic Device, press/temp:     min []  Other:    [] Skin assessment post-treatment (if applicable):    []  intact    []  redness- no adverse reaction     []redness  adverse reaction:        45 min Therapeutic Exercise:  [x]  See flow sheet   Rationale:      increase ROM and increase strength to improve the patients ability to return to light lifting      10 min Manual Therapy: PA mobs to l/s & mid/lower t/s, OP to REIL in prone at level of mid t/s Rationale:     Improve ROM so ROM is available for ADLs      min Therapeutic Activity:    Rationale:         min Neuromuscular Re-ed:    Rationale:       min Gait Training:    Rationale:       min Patient Education:  YES  Reviewed HEP   []  Progressed/Changed HEP based on: Other Objective/Functional Measures:    TE per flow sheet   Post Treatment Pain Level (on 0 to 10) scale:   0  / 10     ASSESSMENT  Assessment/Changes in Function:     Improved tolerance to progressive stab ex in H/L, level III tabletop    []  See Progress Note/Recertification   Patient will continue to benefit from skilled PT services to modify and progress therapeutic interventions, address functional mobility deficits, address ROM deficits, address strength deficits, analyze and cue movement patterns, analyze and modify body mechanics/ergonomics, assess and modify postural abnormalities and instruct in home and community integration to attain remaining goals.    Progress toward goals / Updated goals:    Progressing towards newly established LTG      PLAN  [x]  Upgrade activities as tolerated YES Continue plan of care   []  Discharge due to :    []  Other:      Therapist: Olinda Andrew PT    Date: 11/9/2017 Time: 7:15 PM     Future Appointments  Date Time Provider Violetta Wolfe   11/13/2017 6:30 AM Olinda Andrew PT [place] [address]   11/17/2017 6:30 AM Olinda Andrew PT [place] [address]   11/20/2017 6:30 AM Olinda Andrew PT [place] [address]   11/27/2017 6:30 AM Olinda Andrew PT [place] [address]   12/1/2017 6:30 AM Olinda Andrew PT [address]

## 2017-11-13 ENCOUNTER — HOSPITAL ENCOUNTER (OUTPATIENT)
Dept: PHYSICAL THERAPY | Age: 57
Discharge: HOME | End: 2017-11-13
Payer: COMMERCIAL

## 2017-11-13 PROCEDURE — 97110 THERAPEUTIC EXERCISES: CPT

## 2017-11-13 NOTE — PROGRESS NOTES
PHYSICAL THERAPY - DAILY TREATMENT NOTE    Patient Name: Bailey Disla        Date: 2017  : 1960   YES Patient  Verified  Visit #:   15   of   18  Insurance: Payor: Reilly Durham / Plan: [address] [place] [address] / Product Type: PPO /      In time: 6:35 A Out time: 7:45 A   Total Treatment Time: 65     Medicare Time Tracking (below)   Total Timed Codes (min):  NA 1:1 Treatment Time:  NA     TREATMENT AREA =  Low back pain [M54.5]    SUBJECTIVE  Pain Level (on 0 to 10 scale):  0  / 10   Medication Changes/New allergies or changes in medical history, any new surgeries or procedures? NO    If yes, update Summary List   Subjective Functional Status/Changes:  []  No changes reported     Patient reports that she is doing well, no pain today.            OBJECTIVE  Modalities Rationale:     decrease pain to improve patient's ability to return to pain-free AD:s    min [] Estim, type/location:                                      []  att     []  unatt     []  w/US     []  w/ice    []  w/heat    min []  Mechanical Traction: type/lbs                   []  pro   []  sup   []  int   []  cont    []  before manual    []  after manual    min []  Ultrasound, settings/location:      min []  Iontophoresis w/ dexamethasone, location:                                               []  take home patch       []  in clinic   10 min []  Ice     [x]  Heat    location/position: Supine to l/s     min []  Vasopneumatic Device, press/temp:     min []  Other:    [] Skin assessment post-treatment (if applicable):    []  intact    []  redness- no adverse reaction     []redness  adverse reaction:        55 min Therapeutic Exercise:  [x]  See flow sheet   Rationale:      increase ROM and increase strength to improve the patients ability to return to pain-free lifting      min Manual Therapy:    Rationale:          min Therapeutic Activity:    Rationale:         min Neuromuscular Re-ed:    Rationale:       min Gait Training: Rationale:       min Patient Education:  YES  Reviewed HEP   []  Progressed/Changed HEP based on: Other Objective/Functional Measures:    TE per flow sheet     Post Treatment Pain Level (on 0 to 10) scale:   0  / 10     ASSESSMENT  Assessment/Changes in Function:     Good tolerance to current treatment no increase in pain today     []  See Progress Note/Recertification   Patient will continue to benefit from skilled PT services to modify and progress therapeutic interventions, address functional mobility deficits, address ROM deficits, address strength deficits, assess and modify postural abnormalities and instruct in home and community integration to attain remaining goals.    Progress toward goals / Updated goals:    Progressing towards LTG 1.2     PLAN  [x]  Upgrade activities as tolerated YES Continue plan of care   []  Discharge due to :    []  Other:      Therapist: Desmond Washington PT    Date: 11/13/2017 Time: 7:43 AM     Future Appointments  Date Time Provider Violetta Wolfe   11/17/2017 6:30 AM Des[place] [facility]   11/20/2017 6:30 AM Desmond Washington PT [facility] [place]   11/27/2017 6:30 AM Desmond Washington PT [facility] [place]   12/1/2017 6:30 AM Desmond Washington PT [facility]

## 2017-11-17 ENCOUNTER — HOSPITAL ENCOUNTER (OUTPATIENT)
Dept: PHYSICAL THERAPY | Age: 57
Discharge: HOME | End: 2017-11-17
Payer: COMMERCIAL

## 2017-11-17 PROCEDURE — 97110 THERAPEUTIC EXERCISES: CPT

## 2017-11-17 NOTE — PROGRESS NOTES
PHYSICAL THERAPY - DAILY TREATMENT NOTE    Patient Name: John Roca        Date: 2017  : 1960   YES Patient  Verified  Visit #:     Insurance: Payor: Aaron Camiol / Plan: [address] [place] [address] / Product Type: PPO /      In time: 6:35 A Out time: 7:40 A   Total Treatment Time: 60     Medicare Time Tracking (below)   Total Timed Codes (min):  NA 1:1 Treatment Time:  NA     TREATMENT AREA =  Low back pain [M54.5]    SUBJECTIVE  Pain Level (on 0 to 10 scale):  0  / 10   Medication Changes/New allergies or changes in medical history, any new surgeries or procedures? NO    If yes, update Summary List   Subjective Functional Status/Changes:  []  No changes reported     Patient reports no new complaints, she has had two sessions of a private pilates class & this did no hurt her back.           OBJECTIVE  Modalities Rationale:     decrease pain to improve patient's ability to return to pain-free ADLs   min [] Estim, type/location:                                      []  att     []  unatt     []  w/US     []  w/ice    []  w/heat    min []  Mechanical Traction: type/lbs                   []  pro   []  sup   []  int   []  cont    []  before manual    []  after manual    min []  Ultrasound, settings/location:      min []  Iontophoresis w/ dexamethasone, location:                                               []  take home patch       []  in clinic   10 min []  Ice     [x]  Heat    location/position: Supine to l/s     min []  Vasopneumatic Device, press/temp:     min []  Other:    [] Skin assessment post-treatment (if applicable):    []  intact    []  redness- no adverse reaction     []redness  adverse reaction:        50 min Therapeutic Exercise:  [x]  See flow sheet   Rationale:      increase ROM and increase strength to improve the patients ability to return to pain-free standing      min Manual Therapy:    Rationale:          min Therapeutic Activity:    Rationale:         min Neuromuscular Re-ed:    Rationale:       min Gait Training:    Rationale:       min Patient Education:  YES  Reviewed HEP   []  Progressed/Changed HEP based on: Other Objective/Functional Measures:    TE per flow sheet  Added YADIEL plank & Tband row/ext in 1/2 kneel    Post Treatment Pain Level (on 0 to 10) scale:   0  / 10     ASSESSMENT  Assessment/Changes in Function:     Good tolerance to progression of stab ex today      []  See Progress Note/Recertification   Patient will continue to benefit from skilled PT services to modify and progress therapeutic interventions, address functional mobility deficits, address ROM deficits, address strength deficits, assess and modify postural abnormalities and instruct in home and community integration to attain remaining goals.    Progress toward goals / Updated goals:    Progressing towards LTG 1, 2     PLAN  [x]  Upgrade activities as tolerated YES Continue plan of care   []  Discharge due to :    []  Other:      Therapist: Jaimie Donohue PT    Date: 11/17/2017 Time: 9:18 AM     Future Appointments  Date Time Provider Violetta Wolfe   11/20/2017 6:30 AM Vito Ibanez [facility] [place]   11/27/2017 6:30 AM Jaimie Donohue PT [facility]   12/1/2017 6:30 AM Jaimie Donohue PT [facility]

## 2017-11-20 ENCOUNTER — HOSPITAL ENCOUNTER (OUTPATIENT)
Dept: PHYSICAL THERAPY | Age: 57
Discharge: HOME | End: 2017-11-20
Payer: COMMERCIAL

## 2017-11-20 PROCEDURE — 97110 THERAPEUTIC EXERCISES: CPT

## 2017-11-20 NOTE — PROGRESS NOTES
PHYSICAL THERAPY - DAILY TREATMENT NOTE    Patient Name: Bailey Disla        Date: 2017  : 1960   YES Patient  Verified  Visit #:   15   of   18  Insurance: Payor: Reilly Durham / Plan: [address] [place] [address] / Product Type: PPO /      In time: 6:30 A Out time: 7:50 A   Total Treatment Time: 70     Medicare Time Tracking (below)   Total Timed Codes (min):  NA 1:1 Treatment Time:  NA     TREATMENT AREA =  Low back pain [M54.5]    SUBJECTIVE  Pain Level (on 0 to 10 scale):  0  / 10   Medication Changes/New allergies or changes in medical history, any new surgeries or procedures? NO    If yes, update Summary List   Subjective Functional Status/Changes:  []  No changes reported     Patient reports she was sore in her lower back after last visit, but she was not in pain.            OBJECTIVE  Modalities Rationale:     decrease pain to improve patient's ability to return to pain-free ADLs   min [] Estim, type/location:                                      []  att     []  unatt     []  w/US     []  w/ice    []  w/heat    min []  Mechanical Traction: type/lbs                   []  pro   []  sup   []  int   []  cont    []  before manual    []  after manual    min []  Ultrasound, settings/location:      min []  Iontophoresis w/ dexamethasone, location:                                               []  take home patch       []  in clinic   10 min []  Ice     [x]  Heat    location/position: Supine to l/s     min []  Vasopneumatic Device, press/temp:     min []  Other:    [] Skin assessment post-treatment (if applicable):    []  intact    []  redness- no adverse reaction     []redness  adverse reaction:        60 min Therapeutic Exercise:  [x]  See flow sheet   Rationale:      increase ROM and increase strength to improve the patients ability to return to light lifting      min Manual Therapy:    Rationale:          min Therapeutic Activity:    Rationale:         min Neuromuscular Re-ed:    Rationale: min Gait Training:    Rationale:       min Patient Education:  YES  Reviewed HEP   []  Progressed/Changed HEP based on: Other Objective/Functional Measures:    TE per flow sheet     Post Treatment Pain Level (on 0 to 10) scale:   0  / 10     ASSESSMENT  Assessment/Changes in Function:     Good tolerance to today's treatment, difficulty with l/s stab/plank progressions     []  See Progress Note/Recertification   Patient will continue to benefit from skilled PT services to modify and progress therapeutic interventions, address functional mobility deficits, address ROM deficits, address strength deficits, assess and modify postural abnormalities and instruct in home and community integration to attain remaining goals.    Progress toward goals / Updated goals:    Progressing towards LTGs     PLAN  [x]  Upgrade activities as tolerated YES Continue plan of care   []  Discharge due to :    []  Other:      Therapist: Desmond Washington PT    Date: 11/20/2017 Time: 8:42 AM     Future Appointments  Date Time Provider Violetta Wolfe   11/27/2017 6:30 AM [place] [facility] [place]   12/1/2017 6:30 AM [place] [facility] [place] [facility] [place]

## 2017-11-27 ENCOUNTER — HOSPITAL ENCOUNTER (OUTPATIENT)
Dept: PHYSICAL THERAPY | Age: 57
Discharge: HOME | End: 2017-11-27
Payer: COMMERCIAL

## 2017-11-27 PROCEDURE — 97110 THERAPEUTIC EXERCISES: CPT

## 2017-11-27 NOTE — PROGRESS NOTES
PHYSICAL THERAPY - DAILY TREATMENT NOTE    Patient Name: Kassie Loza        Date: 2017  : 1960   YES Patient  Verified  Visit #:   16      18  Insurance: Payor: Shankar Phelps / Plan: [address] [place] [address] / Product Type: PPO /      In time: 7 A Out time: 8:05 A   Total Treatment Time: 60     Medicare Time Tracking (below)   Total Timed Codes (min):  Na 1:1 Treatment Time:  Na     TREATMENT AREA =  Low back pain [M54.5]    SUBJECTIVE  Pain Level (on 0 to 10 scale):  0  / 10   Medication Changes/New allergies or changes in medical history, any new surgeries or procedures? NO    If yes, update Summary List   Subjective Functional Status/Changes:  []  No changes reported     Patient reports that she is doing well, some soreness in her lower back after doing a lot of lifting for the holiday weekend.         OBJECTIVEs  Modalities Rationale:     decrease pain to improve patient's ability to return to pain-free lifting at home    min [] Estim, type/location:                                      []  att     []  unatt     []  w/US     []  w/ice    []  w/heat    min []  Mechanical Traction: type/lbs                   []  pro   []  sup   []  int   []  cont    []  before manual    []  after manual    min []  Ultrasound, settings/location:      min []  Iontophoresis w/ dexamethasone, location:                                               []  take home patch       []  in clinic   10 min []  Ice     [x]  Heat    location/position: Supine to l/s    min []  Vasopneumatic Device, press/temp:     min []  Other:    [] Skin assessment post-treatment (if applicable):    []  intact    []  redness- no adverse reaction     []redness  adverse reaction:        50 min Therapeutic Exercise:  [x]  See flow sheet   Rationale:      increase ROM and increase strength to improve the patients ability to return to PLOF      min Manual Therapy:    Rationale:          min Therapeutic Activity:    Rationale:         min Neuromuscular Re-ed:    Rationale:       min Gait Training:    Rationale:       min Patient Education:  YES  Reviewed HEP   []  Progressed/Changed HEP based on: Other Objective/Functional Measures:  TE per flow sheet     Post Treatment Pain Level (on 0 to 10) scale:   0  / 10     ASSESSMENT  Assessment/Changes in Function:     Modified sideplank (lower leg in flexion, top leg extended) to avoid trunk rotation      []  See Progress Note/Recertification   Patient will continue to benefit from skilled PT services to modify and progress therapeutic interventions, address functional mobility deficits, address ROM deficits, address strength deficits, analyze and cue movement patterns and instruct in home and community integration to attain remaining goals.    Progress toward goals / Updated goals:    Progressing towards LTGs     PLAN  [x]  Upgrade activities as tolerated YES Continue plan of care   []  Discharge due to :    []  Other:      Therapist: Jabari Elizondo PT    Date: 11/27/2017 Time: 8:52 AM     Future Appointments  Date Time Provider Violetta Wolfe   12/1/2017 6:30 AM Jabari Elizondo PT [facility] [place]

## 2017-12-01 ENCOUNTER — HOSPITAL ENCOUNTER (OUTPATIENT)
Dept: PHYSICAL THERAPY | Age: 57
Discharge: HOME | End: 2017-12-01
Payer: COMMERCIAL

## 2017-12-01 PROCEDURE — 97110 THERAPEUTIC EXERCISES: CPT

## 2017-12-01 PROCEDURE — 97140 MANUAL THERAPY 1/> REGIONS: CPT

## 2017-12-01 NOTE — PROGRESS NOTES
PHYSICAL THERAPY - DAILY TREATMENT NOTE    Patient Name: Teena Dunn        Date: 2017  : 1960   YES Patient  Verified  Visit #:     Insurance: Payor: Mary Lou Gama / Plan: [address] [place] [address] / Product Type: PPO /      In time: 6:35 A Out time: 7:35 A   Total Treatment Time: 55     Medicare Time Tracking (below)   Total Timed Codes (min):  NA 1:1 Treatment Time:  NA     TREATMENT AREA =  Low back pain [M54.5]    SUBJECTIVE  Pain Level (on 0 to 10 scale):  0  / 10   Medication Changes/New allergies or changes in medical history, any new surgeries or procedures? NO    If yes, update Summary List   Subjective Functional Status/Changes:  []  No changes reported     Patient reports no new complaints since last treatment, her back is hurting a little but only when she moves a certain way.            OBJECTIVE  Modalities Rationale:     decrease pain to improve patient's ability to return to pain-free standing   min [] Estim, type/location:                                      []  att     []  unatt     []  w/US     []  w/ice    []  w/heat    min []  Mechanical Traction: type/lbs                   []  pro   []  sup   []  int   []  cont    []  before manual    []  after manual    min []  Ultrasound, settings/location:      min []  Iontophoresis w/ dexamethasone, location:                                               []  take home patch       []  in clinic   10 min []  Ice     [x]  Heat    location/position: Supine to l/s     min []  Vasopneumatic Device, press/temp:     min []  Other:    [] Skin assessment post-treatment (if applicable):    []  intact    []  redness- no adverse reaction     []redness  adverse reaction:        35 min Therapeutic Exercise:  [x]  See flow sheet   Rationale:      increase ROM and increase strength to improve the patients ability to return to PLOF     10 min Manual Therapy: PA mobs to l/s f/b OP to REIL in prone   Rationale:     Improve mobility with l/s AROM with upright postures     min Therapeutic Activity:    Rationale:         min Neuromuscular Re-ed:    Rationale:       min Gait Training:    Rationale:       min Patient Education:  YES  Reviewed HEP   []  Progressed/Changed HEP based on: Other Objective/Functional Measures:    Held plank progressions today given slight increase in pain     Post Treatment Pain Level (on 0 to 10) scale:   0  / 10     ASSESSMENT  Assessment/Changes in Function:     Good tolerance to today with decreased c/o pain with REIL after manual rx to l/s      []  See Progress Note/Recertification   Patient will continue to benefit from skilled PT services to modify and progress therapeutic interventions, address functional mobility deficits, address ROM deficits, address strength deficits, assess and modify postural abnormalities and instruct in home and community integration to attain remaining goals.    Progress toward goals / Updated goals:    Progressing towards LTGs     PLAN  [x]  Upgrade activities as tolerated YES Continue plan of care   []  Discharge due to :    []  Other:      Therapist: Estefany Vega PT    Date: 12/1/2017 Time: 7:45 AM     Future Appointments  Date Time Provider Violetta Wolfe   12/4/2017 8:00 AM Estef[place] [facility] [place]   12/8/2017 7:00 AM Estef[place] [facility] [place]   12/11/2017 6:30 AM Estefany Vega PT [facility]   12/15/2017 8:00 AM Estefany [place]ega PT [facility] [place]   12/18/2017 6:30 AM Estefany Vega PT [facility]   12/22/2017 7:00 AM Estefany Vega PT [facility]   12/29/2017 7:00 AM Nancy Samson, PT [facility]

## 2017-12-04 ENCOUNTER — HOSPITAL ENCOUNTER (OUTPATIENT)
Dept: PHYSICAL THERAPY | Age: 57
Discharge: HOME | End: 2017-12-04
Payer: COMMERCIAL

## 2017-12-04 PROCEDURE — 97140 MANUAL THERAPY 1/> REGIONS: CPT

## 2017-12-04 PROCEDURE — 97110 THERAPEUTIC EXERCISES: CPT

## 2017-12-04 NOTE — PROGRESS NOTES
PHYSICAL THERAPY - DAILY TREATMENT NOTE    Patient Name: Miracle Milligan        Date: 2017  : 1960   YES Patient  Verified  Visit #:   18   of   18  Insurance: Payor: Bret Rivera / Plan: [address] [place] [address] / Product Type: PPO /      In time: 8:05 A Out time: 9:15 A   Total Treatment Time: 70     Medicare Time Tracking (below)   Total Timed Codes (min):  NA 1:1 Treatment Time:  NA     TREATMENT AREA =  Low back pain [M54.5]    SUBJECTIVE  Pain Level (on 0 to 10 scale):  0  / 10   Medication Changes/New allergies or changes in medical history, any new surgeries or procedures? NO    If yes, update Summary List   Subjective Functional Status/Changes:  []  No changes reported     Patient reports no new complaints since last treatment.            OBJECTIVE  Modalities Rationale:     decrease pain to improve patient's ability to return to pain-free standing   min [] Estim, type/location:                                      []  att     []  unatt     []  w/US     []  w/ice    []  w/heat    min []  Mechanical Traction: type/lbs                   []  pro   []  sup   []  int   []  cont    []  before manual    []  after manual    min []  Ultrasound, settings/location:      min []  Iontophoresis w/ dexamethasone, location:                                               []  take home patch       []  in clinic   10 min []  Ice     [x]  Heat    location/position: Supine to l/s     min []  Vasopneumatic Device, press/temp:     min []  Other:    [] Skin assessment post-treatment (if applicable):    []  intact    []  redness- no adverse reaction     []redness  adverse reaction:        50 min Therapeutic Exercise:  [x]  See flow sheet   Rationale:      increase ROM and increase strength to improve the patients ability to return to pain-free lifting     10 min Manual Therapy: PA mobs to l/s, OP to REIL    Rationale:      correct positional vertigo and increase postural awareness to improve patient's ability to return to sx free ADLs     min Therapeutic Activity:    Rationale:      min Neuromuscular Re-ed:    Rationale:        min Gait Training:    Rationale:       min Patient Education:  YES  Reviewed HEP   []  Progressed/Changed HEP based on: Other Objective/Functional Measures:    TE per flow sheet     Post Treatment Pain Level (on 0 to 10) scale:   0  / 10     ASSESSMENT  Assessment/Changes in Function:   C/o pain with FIS today     []  See Progress Note/Recertification   Patient will continue to benefit from skilled PT services to modify and progress therapeutic interventions, address functional mobility deficits, address ROM deficits, analyze and modify body mechanics/ergonomics, assess and modify postural abnormalities and instruct in home and community integration to attain remaining goals.    Progress toward goals / Updated goals:    Progressing towards LTGs     PLAN  [x]  Upgrade activities as tolerated YES Continue plan of care   []  Discharge due to :    [x]  Other: Re-assess n/v      Therapist: Twila Francis PT    Date: 12/4/2017 Time: 10:49 AM     Future Appointments  Date Time Provider Violetta Wolfe   12/8/2017 7:00 AM Rishite Co, PT [facility] [place]   12/11/2017 6:30 AM Twila Francis, [place] [facility] [place]   12/15/2017 8:00 AM Twila Francis, PT [facility] [place]   12/18/2017 6:30 AM Twila Francis, PT [facility]   12/22/2017 7:00 AM Twila Francis, PT [facility]   12/29/2017 7:00 AM Twila Francis, PT [facility]

## 2017-12-08 ENCOUNTER — HOSPITAL ENCOUNTER (OUTPATIENT)
Dept: PHYSICAL THERAPY | Age: 57
End: 2017-12-08
Payer: COMMERCIAL

## 2017-12-11 ENCOUNTER — HOSPITAL ENCOUNTER (OUTPATIENT)
Dept: PHYSICAL THERAPY | Age: 57
Discharge: HOME | End: 2017-12-11
Payer: COMMERCIAL

## 2017-12-11 PROCEDURE — 97140 MANUAL THERAPY 1/> REGIONS: CPT

## 2017-12-11 PROCEDURE — 97110 THERAPEUTIC EXERCISES: CPT

## 2017-12-11 NOTE — PROGRESS NOTES
[address]ader [address]  [facility]  [facility]  Jorge Ellis [facility] [address], [address] ,[address], [address]  Phone: [phone]  Fax: [phone]  PROGRESS NOTE  Patient Name: Belle Bell : 1960   Treatment/Medical Diagnosis: Low back pain [M54.5]   Referral Source: Nickolas Stephens MD     Date of Initial Visit: 17 Attended Visits: 19 Missed Visits: 0     SUMMARY OF TREATMENT  Therapeutic exercise including ROM, stretching, gentle strengthening, l/s stabilization training, postural ed, patient education, HEP instruction, MHP, manual therapy including l/s mobs to improve mobility. Kain Garzon has made good progress with PT & reports intermittent c/o LBP & L hip pain which has improved since initiation of PT. We have progressed her current home program & she has recently resumed her recreational fitness program including home videos & personal pilates training sessions without exacerbation of sx. Goal/Measure of Progress Goal Met? 1. Improve FOTO score from 59 points to > or = 70 points indicating improved tolerance with ADLs in regards to lower back. Status at last Eval: 59 Current Status: 72 yes   2. Patient will demonstrate (-) neural tension with B prone knee flexion on B LE to facilitate driving activities with manageable sx. Status at last Eval: (+) B LE Current Status: (-) on R  (+) very mild on L Partially met     New Goals to be achieved in __3-4__  weeks:  1. Patient to report 75% improvement in overall function in preparation for return to recreational activities with manageable sx in lower back. 2.  Patient will be able to demonstrate the appropriate body mechanics with lifting weighted box to prevent further injury for return to lifting at home/work. 3.  Patient to be independent & compliant with HEP in preparation for D/C.      G-Codes (GP): NA  RECOMMENDATIONS  Patient to continue with PT for up to 3-4 more visits prn in order to progress towards DC to HEP. If you have any questions/comments please contact us directly at [phone]. Thank you for allowing us to assist in the care of your patient. Therapist Signature: KAYLEE Macias, cert MDT Date: 64/18/6287     Time: 2:10 PM   NOTE TO PHYSICIAN:  PLEASE COMPLETE THE ORDERS BELOW AND FAX TO   [facility]: [phone]. If you are unable to process this request in 24 hours please contact our office: [phone].    ___ I have read the above report and request that my patient continue as recommended.   ___ I have read the above report and request that my patient continue therapy with the following changes/special instructions:_________________________________________________________   ___ I have read the above report and request that my patient be discharged from therapy.      Physician Signature:        Date:       Time:

## 2017-12-11 NOTE — PROGRESS NOTES
PHYSICAL THERAPY - DAILY TREATMENT NOTE    Patient Name: Teena Dunn        Date: 2017  : 1960   YES Patient  Verified  Visit #:     Insurance: Payor: Mary Lou Gama / Plan: [address] [place] [address] / Product Type: PPO /      In time: 6:30 A Out time: 7:50 P   Total Treatment Time: 65     Medicare Time Tracking (below)   Total Timed Codes (min):  NA 1:1 Treatment Time:  NA     TREATMENT AREA =  Low back pain [M54.5]    SUBJECTIVE  Pain Level (on 0 to 10 scale):  0  / 10   Medication Changes/New allergies or changes in medical history, any new surgeries or procedures?     NO    If yes, update Summary List   Subjective Functional Status/Changes:  []  No changes reported     See PN         OBJECTIVE  Modalities Rationale:     decrease pain to improve patient's ability to return to pain-free    min [] Estim, type/location:                                      []  att     []  unatt     []  w/US     []  w/ice    []  w/heat    min []  Mechanical Traction: type/lbs                   []  pro   []  sup   []  int   []  cont    []  before manual    []  after manual    min []  Ultrasound, settings/location:      min []  Iontophoresis w/ dexamethasone, location:                                               []  take home patch       []  in clinic   10 min []  Ice     [x]  Heat    location/position: Supine to l/s     min []  Vasopneumatic Device, press/temp:     min []  Other:    [] Skin assessment post-treatment (if applicable):    []  intact    []  redness- no adverse reaction     []redness  adverse reaction:        45 min Therapeutic Exercise:  [x]  See flow sheet   Rationale:      increase ROM and increase strength to improve the patients ability to return to pain-free ADLs     10 min Manual Therapy: PA mobs to l/s, f/b OP to REIL   Rationale:          min Therapeutic Activity:    Rationale:         min Neuromuscular Re-ed:    Rationale:       min Gait Training:    Rationale:       min Patient Education:  YES  Reviewed HEP   []  Progressed/Changed HEP based on: Other Objective/Functional Measures:    FOTO 72     Post Treatment Pain Level (on 0 to 10) scale:   0  / 10     ASSESSMENT  Assessment/Changes in Function:     Good progress with PT, discussed DC planning, to decrease to 1x/week & DC in ~3 visits     []  See Progress Note/Recertification   Patient will continue to benefit from skilled PT services to modify and progress therapeutic interventions, address functional mobility deficits, address ROM deficits, address strength deficits, assess and modify postural abnormalities and instruct in home and community integration to attain remaining goals.    Progress toward goals / Updated goals:    Progressing towards LTG 2, LTG 1, 3 met      PLAN  [x]  Upgrade activities as tolerated YES Continue plan of care   []  Discharge due to :    [x]  Other: PN to MD     Therapist: Twila Francis PT    Date: 12/11/2017 Time: 7:02 AM     Future Appointments  Date Time Provider Violetta Wolfe   12/15/2017 7:00 AM Twila Francis PT [facility] [place]   12/18/2017 6:30 AM Twila Francis PT [facility] [place]   12/22/2017 7:00 AM Twila Francis PT [facility]   12/29/2017 7:00 AM Twila Francis, PT [facility] [place]

## 2017-12-15 ENCOUNTER — HOSPITAL ENCOUNTER (OUTPATIENT)
Dept: PHYSICAL THERAPY | Age: 57
Discharge: HOME | End: 2017-12-15
Payer: COMMERCIAL

## 2017-12-15 PROCEDURE — 97110 THERAPEUTIC EXERCISES: CPT

## 2017-12-15 PROCEDURE — 97140 MANUAL THERAPY 1/> REGIONS: CPT

## 2017-12-15 NOTE — PROGRESS NOTES
PHYSICAL THERAPY - DAILY TREATMENT NOTE    Patient Name: Laurie Collins        Date: 12/15/2017  : 1960   YES Patient  Verified  Visit #:     Insurance: Payor: Edwar Cunha / Plan: [address] [place] [address] / Product Type: PPO /      In time: 7:05 A Out time: 8:20 A   Total Treatment Time: 70     Medicare Time Tracking (below)   Total Timed Codes (min):  NA 1:1 Treatment Time:  NA     TREATMENT AREA =  Low back pain [M54.5]    SUBJECTIVE  Pain Level (on 0 to 10 scale):  0  / 10   Medication Changes/New allergies or changes in medical history, any new surgeries or procedures? NO    If yes, update Summary List   Subjective Functional Status/Changes:  []  No changes reported     Patient reports that she will start her pilates classes back next week, no new complaints in regards to her back.            OBJECTIVE  Modalities Rationale:     decrease pain to improve patient's ability to return to pain-free lifting   min [] Estim, type/location:                                      []  att     []  unatt     []  w/US     []  w/ice    []  w/heat    min []  Mechanical Traction: type/lbs                   []  pro   []  sup   []  int   []  cont    []  before manual    []  after manual    min []  Ultrasound, settings/location:      min []  Iontophoresis w/ dexamethasone, location:                                               []  take home patch       []  in clinic   10 min []  Ice     [x]  Heat    location/position: Supine to l/s    min []  Vasopneumatic Device, press/temp:     min []  Other:    [] Skin assessment post-treatment (if applicable):    []  intact    []  redness- no adverse reaction     []redness  adverse reaction:        40 min Therapeutic Exercise:  [x]  See flow sheet   Rationale:      increase ROM and increase strength to improve the patients ability to return to pain-free ADLs     10 min Manual Therapy: PA mobs to l/s, unilateral rot mob in ext B, OP to REIL    Rationale:      increase ROM to improve patient's ability to tolerate pain-free upright standing     min Therapeutic Activity:    Rationale:      min Neuromuscular Re-ed:    Rationale:        min Gait Training:    Rationale:       min Patient Education:  YES  Reviewed HEP   []  Progressed/Changed HEP based on: Other Objective/Functional Measures:    Resumed plank progression today      Post Treatment Pain Level (on 0 to 10) scale:   0  / 10     ASSESSMENT  Assessment/Changes in Function:   Good tolerance to today's treatment, no increase in pain      []  See Progress Note/Recertification   Patient will continue to benefit from skilled PT services to modify and progress therapeutic interventions, address functional mobility deficits, address ROM deficits, address strength deficits, assess and modify postural abnormalities and instruct in home and community integration to attain remaining goals.    Progress toward goals / Updated goals:    Progressing towards newly established LTGs     PLAN  [x]  Upgrade activities as tolerated YES Continue plan of care   []  Discharge due to :    []  Other:      Therapist: Isaac Carr PT    Date: 12/15/2017 Time: 8:21 AM     Future Appointments  Date Time Provider Violetta Wolfe   12/22/2017 7:00 AM Isaac Carr PT [facility]   12/29/2017 7:00 AM Isaac Carr PT [facility] [place]

## 2017-12-18 ENCOUNTER — APPOINTMENT (OUTPATIENT)
Dept: PHYSICAL THERAPY | Age: 57
End: 2017-12-18
Payer: COMMERCIAL

## 2017-12-22 ENCOUNTER — HOSPITAL ENCOUNTER (OUTPATIENT)
Dept: PHYSICAL THERAPY | Age: 57
End: 2017-12-22
Payer: COMMERCIAL

## 2017-12-29 ENCOUNTER — HOSPITAL ENCOUNTER (OUTPATIENT)
Dept: PHYSICAL THERAPY | Age: 57
Discharge: HOME | End: 2017-12-29
Payer: COMMERCIAL

## 2017-12-29 PROCEDURE — 97110 THERAPEUTIC EXERCISES: CPT

## 2017-12-29 NOTE — PROGRESS NOTES
PHYSICAL THERAPY - DAILY TREATMENT NOTE    Patient Name: Betty Espinosa        Date: 2017  : 1960   YES Patient  Verified  Visit #:     Insurance: Payor: Ellen Alter / Plan: [address] [place] [address] / Product Type: PPO /      In time: 7:10 A Out time: 8:10 A   Total Treatment Time: 60     Medicare Time Tracking (below)   Total Timed Codes (min):  NA 1:1 Treatment Time:  NA     TREATMENT AREA =  Low back pain [M54.5]    SUBJECTIVE  Pain Level (on 0 to 10 scale):  0  / 10   Medication Changes/New allergies or changes in medical history, any new surgeries or procedures?     NO    If yes, update Summary List   Subjective Functional Status/Changes:  []  No changes reported     See DC summary           OBJECTIVE  Modalities Rationale:     decrease pain to improve patient's ability to return to pain-free ADLs   min [] Estim, type/location:                                      []  att     []  unatt     []  w/US     []  w/ice    []  w/heat    min []  Mechanical Traction: type/lbs                   []  pro   []  sup   []  int   []  cont    []  before manual    []  after manual    min []  Ultrasound, settings/location:      min []  Iontophoresis w/ dexamethasone, location:                                               []  take home patch       []  in clinic   10 min []  Ice     [x]  Heat    location/position: Supine to l/s     min []  Vasopneumatic Device, press/temp:     min []  Other:    [] Skin assessment post-treatment (if applicable):    []  intact    []  redness- no adverse reaction     []redness  adverse reaction:        50 min Therapeutic Exercise:  [x]  See flow sheet   Rationale:      increase ROM and increase strength to improve the patients ability to return to PLOF       min Manual Therapy:    Rationale:          min Therapeutic Activity:    Rationale:         min Neuromuscular Re-ed:    Rationale:       min Gait Training:    Rationale:       min Patient Education:  YES  Reviewed HEP []  Progressed/Changed HEP based on: Other Objective/Functional Measures:    Pt able to perform box lifting from floor to chest left with unweighted box  FOTO 70     Post Treatment Pain Level (on 0 to 10) scale:   0  / 10     ASSESSMENT  Assessment/Changes in Function:     Pt indep with HEP     []  See Progress Note/Recertification   Patient will continue to benefit from skilled PT services to instruct in home and community integration to attain remaining goals. Progress toward goals / Updated goals: All goals met      PLAN  []  Upgrade activities as tolerated  Continue plan of care   [x]  Discharge due to : All goals met    []  Other:      Therapist: Martha Casas, PT    Date: 12/29/2017 Time: 8:36 AM     No future appointments.

## 2017-12-29 NOTE — PROGRESS NOTES
[address]  [facility]  [facility], [address] ,[address], [address]  Phone: [phone]  Fax: [phone] SUMMARY  Patient Name: Jenifer Rosales : 1960   Treatment/Medical Diagnosis: Low back pain [M54.5]   Referral Source: Fara Infante MD     Date of Initial Visit: 17 Attended Visits: 21 Missed Visits: 1     SUMMARY OF TREATMENT  Therapeutic exercise including ROM, stretching, gentle strengthening, stabilization training, postural ed, patient education, HEP instruction, P. [facility]  Mrs. Abdias Nelson has made good progress with PT & reports intermittent c/o pain in lower back with previous c/o \"sharp\" pain on the L side of her lower back/hip generally fully resolved. Improved l/s AROM, FIS 75%, ext full, B SGIS was slightly limited by pain at 75%. She is now able to participate in a regular pilates & TRX fitness class 2-3xs/week with manageable sx in her back. She is pleased with her progress & feels ready for DC to HEP to maintain current level of function. Goal/Measure of Progress Goal Met? 1. Patient to report 75% improvement in overall function in preparation for return to recreational activities with manageable sx in lower back. Status at last Eval: NA Current Status: 75-80% yes   2. Patient will be able to demonstrate the appropriate body mechanics with lifting weighted box to prevent further injury for return to lifting at home/work. Status at last Eval: NA Current Status: Pt able to perform box lift with good mechanics from floor to chest yes   3. Patient to be independent & compliant with HEP in preparation for D/C. Status at last Eval: HEP established Current Status: indep with HEP yes     RECOMMENDATIONS  Discontinue therapy. Progressing towards or have reached established goals. If you have any questions/comments please contact us directly at [phone].    Thank you for allowing us to assist in the care of your patient.     Therapist Signature: KAYLEE Ness, cert MDT Date: 15-55-16     Time: 9:54 AM